# Patient Record
Sex: MALE | Race: WHITE | Employment: STUDENT | URBAN - METROPOLITAN AREA
[De-identification: names, ages, dates, MRNs, and addresses within clinical notes are randomized per-mention and may not be internally consistent; named-entity substitution may affect disease eponyms.]

---
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
## 2022-01-28 ENCOUNTER — OFFICE VISIT (OUTPATIENT)
Dept: PODIATRY CLINIC | Facility: CLINIC | Age: 22
End: 2022-01-28
Payer: COMMERCIAL

## 2022-01-28 DIAGNOSIS — S93.491S SPRAIN OF ANTERIOR TALOFIBULAR LIGAMENT OF RIGHT ANKLE, SEQUELA: ICD-10-CM

## 2022-01-28 DIAGNOSIS — M25.571 PAIN IN LATERAL PORTION OF RIGHT ANKLE: ICD-10-CM

## 2022-01-28 DIAGNOSIS — M72.2 PLANTAR FASCIITIS OF RIGHT FOOT: Primary | ICD-10-CM

## 2022-01-28 PROCEDURE — 99070 SPECIAL SUPPLIES PHYS/QHP: CPT | Performed by: STUDENT IN AN ORGANIZED HEALTH CARE EDUCATION/TRAINING PROGRAM

## 2022-01-28 PROCEDURE — 99203 OFFICE O/P NEW LOW 30 MIN: CPT | Performed by: STUDENT IN AN ORGANIZED HEALTH CARE EDUCATION/TRAINING PROGRAM

## 2022-01-28 PROCEDURE — 73610 X-RAY EXAM OF ANKLE: CPT | Performed by: STUDENT IN AN ORGANIZED HEALTH CARE EDUCATION/TRAINING PROGRAM

## 2022-01-28 RX ORDER — METHYLPREDNISOLONE 4 MG/1
TABLET ORAL
Qty: 21 TABLET | Refills: 0 | Status: SHIPPED | OUTPATIENT
Start: 2022-01-28

## 2022-01-28 NOTE — PROGRESS NOTES
[facility] Podiatry  Progress Note    Serina Dixon is a 25year old male. Patient presents with:  New Patient: Patient here with mother. Right foot Pain for 1 month has gotten worse. Injured Right ankle last year. . Swelling Right foot medial arch. Prosper Valdez denies headaches  MUSCULO: denies arthritis, back pain      EXAM:   There were no vitals taken for this visit. GENERAL: well developed, well nourished, in no apparent distress  EXTREMITIES:   1. Integument: Normal skin temperature and turgor  2.  Vascular: arch of right foot.  -Discussed importance of proper shoe gear and orthotics. Redi orthotics were dispensed to patient in clinic. He should try wearing these with ambulation and in his basketball shoes.   -Medrol Dosepak prescribed to help with inflammati

## 2022-01-29 NOTE — PATIENT INSTRUCTIONS
-Ambulate with supportive shoe gear and inserts.  -Try to perform regular stretching exercises 3-5 times per day. -Take Medrol Dosepak as prescribed.  -Complete physical therapy as prescribed. -Can consider MRI if symptoms worsen or fail to improve.

## 2022-03-14 ENCOUNTER — TELEPHONE (OUTPATIENT)
Dept: PHYSICAL THERAPY | Facility: HOSPITAL | Age: 22
End: 2022-03-14

## 2022-03-16 ENCOUNTER — OFFICE VISIT (OUTPATIENT)
Dept: PHYSICAL THERAPY | Age: 22
End: 2022-03-16
Attending: STUDENT IN AN ORGANIZED HEALTH CARE EDUCATION/TRAINING PROGRAM
Payer: COMMERCIAL

## 2022-03-16 DIAGNOSIS — M72.2 PLANTAR FASCIITIS OF RIGHT FOOT: ICD-10-CM

## 2022-03-16 DIAGNOSIS — S93.491S SPRAIN OF ANTERIOR TALOFIBULAR LIGAMENT OF RIGHT ANKLE, SEQUELA: ICD-10-CM

## 2022-03-16 DIAGNOSIS — M25.571 PAIN IN LATERAL PORTION OF RIGHT ANKLE: ICD-10-CM

## 2022-03-16 PROCEDURE — 97161 PT EVAL LOW COMPLEX 20 MIN: CPT

## 2022-03-16 PROCEDURE — 97110 THERAPEUTIC EXERCISES: CPT

## 2022-03-22 ENCOUNTER — OFFICE VISIT (OUTPATIENT)
Dept: PHYSICAL THERAPY | Age: 22
End: 2022-03-22
Attending: STUDENT IN AN ORGANIZED HEALTH CARE EDUCATION/TRAINING PROGRAM
Payer: COMMERCIAL

## 2022-03-22 PROCEDURE — 97140 MANUAL THERAPY 1/> REGIONS: CPT

## 2022-03-22 PROCEDURE — 97110 THERAPEUTIC EXERCISES: CPT

## 2022-03-24 ENCOUNTER — OFFICE VISIT (OUTPATIENT)
Dept: PHYSICAL THERAPY | Age: 22
End: 2022-03-24
Attending: STUDENT IN AN ORGANIZED HEALTH CARE EDUCATION/TRAINING PROGRAM
Payer: COMMERCIAL

## 2022-03-24 PROCEDURE — 97110 THERAPEUTIC EXERCISES: CPT

## 2022-03-24 PROCEDURE — 97140 MANUAL THERAPY 1/> REGIONS: CPT

## 2022-03-29 ENCOUNTER — OFFICE VISIT (OUTPATIENT)
Dept: PHYSICAL THERAPY | Age: 22
End: 2022-03-29
Attending: STUDENT IN AN ORGANIZED HEALTH CARE EDUCATION/TRAINING PROGRAM
Payer: COMMERCIAL

## 2022-03-29 ENCOUNTER — TELEPHONE (OUTPATIENT)
Dept: PHYSICAL THERAPY | Facility: HOSPITAL | Age: 22
End: 2022-03-29

## 2022-03-29 PROCEDURE — 97140 MANUAL THERAPY 1/> REGIONS: CPT

## 2022-03-29 PROCEDURE — 97110 THERAPEUTIC EXERCISES: CPT

## 2022-03-31 ENCOUNTER — OFFICE VISIT (OUTPATIENT)
Dept: PHYSICAL THERAPY | Age: 22
End: 2022-03-31
Attending: STUDENT IN AN ORGANIZED HEALTH CARE EDUCATION/TRAINING PROGRAM
Payer: COMMERCIAL

## 2022-03-31 PROCEDURE — 97110 THERAPEUTIC EXERCISES: CPT

## 2022-04-04 ENCOUNTER — OFFICE VISIT (OUTPATIENT)
Dept: PHYSICAL THERAPY | Age: 22
End: 2022-04-04
Attending: STUDENT IN AN ORGANIZED HEALTH CARE EDUCATION/TRAINING PROGRAM
Payer: COMMERCIAL

## 2022-04-04 PROCEDURE — 97110 THERAPEUTIC EXERCISES: CPT

## 2022-04-06 ENCOUNTER — OFFICE VISIT (OUTPATIENT)
Dept: PHYSICAL THERAPY | Age: 22
End: 2022-04-06
Attending: STUDENT IN AN ORGANIZED HEALTH CARE EDUCATION/TRAINING PROGRAM
Payer: COMMERCIAL

## 2022-04-06 PROCEDURE — 97110 THERAPEUTIC EXERCISES: CPT

## 2022-04-12 ENCOUNTER — OFFICE VISIT (OUTPATIENT)
Dept: PHYSICAL THERAPY | Age: 22
End: 2022-04-12
Attending: STUDENT IN AN ORGANIZED HEALTH CARE EDUCATION/TRAINING PROGRAM
Payer: COMMERCIAL

## 2022-04-12 PROCEDURE — 97110 THERAPEUTIC EXERCISES: CPT

## 2022-04-12 PROCEDURE — 97140 MANUAL THERAPY 1/> REGIONS: CPT

## 2022-04-27 ENCOUNTER — APPOINTMENT (OUTPATIENT)
Dept: PHYSICAL THERAPY | Age: 22
End: 2022-04-27
Attending: STUDENT IN AN ORGANIZED HEALTH CARE EDUCATION/TRAINING PROGRAM
Payer: COMMERCIAL

## 2022-05-11 ENCOUNTER — OFFICE VISIT (OUTPATIENT)
Dept: PHYSICAL THERAPY | Age: 22
End: 2022-05-11
Attending: STUDENT IN AN ORGANIZED HEALTH CARE EDUCATION/TRAINING PROGRAM
Payer: COMMERCIAL

## 2022-05-11 PROCEDURE — 97110 THERAPEUTIC EXERCISES: CPT

## 2022-05-12 ENCOUNTER — TELEPHONE (OUTPATIENT)
Dept: PODIATRY CLINIC | Facility: CLINIC | Age: 22
End: 2022-05-12

## 2022-05-12 NOTE — TELEPHONE ENCOUNTER
Pt sprained his ankle on Sunday asking if he can be seen today - Dr Rafaela Unger suggested to mom to see Dr Aline Ramsey today

## 2022-05-12 NOTE — TELEPHONE ENCOUNTER
Spoke with mother. States son was playing basketball and tripped over another player's foot, twisting his ankle. He has mild to moderate pain. I offered mother appointment today or tomorrow with another provider, she declines and wishes to see Dr. Kaylah Isabel next week. Patient is wearing cam boot, icin and elevating at rest.  OTC NSAIDs. Scheduled 5/18 and advised observation. To call back for sooner appointment or seek treatment in IC if symptoms progress. She verbalized understanding and compliance.

## 2022-05-18 ENCOUNTER — OFFICE VISIT (OUTPATIENT)
Dept: PODIATRY CLINIC | Facility: CLINIC | Age: 22
End: 2022-05-18
Payer: COMMERCIAL

## 2022-05-18 DIAGNOSIS — S86.301S PERONEAL TENDON INJURY, RIGHT, SEQUELA: ICD-10-CM

## 2022-05-18 DIAGNOSIS — M25.571 ACUTE RIGHT ANKLE PAIN: ICD-10-CM

## 2022-05-18 DIAGNOSIS — S93.491S SPRAIN OF ANTERIOR TALOFIBULAR LIGAMENT OF RIGHT ANKLE, SEQUELA: Primary | ICD-10-CM

## 2022-05-18 PROCEDURE — 73610 X-RAY EXAM OF ANKLE: CPT | Performed by: STUDENT IN AN ORGANIZED HEALTH CARE EDUCATION/TRAINING PROGRAM

## 2022-05-18 PROCEDURE — 99213 OFFICE O/P EST LOW 20 MIN: CPT | Performed by: STUDENT IN AN ORGANIZED HEALTH CARE EDUCATION/TRAINING PROGRAM

## 2022-05-21 NOTE — PATIENT INSTRUCTIONS
-Complete MRI of ankle as ordered. -Remain in cam boot until MRI is complete. Pending MRI results, may possibly transition out of the boot and resume physical therapy.  -Ice/elevate/rest right lower extremity at this time.  -We will call with MRI results.

## 2022-06-02 ENCOUNTER — APPOINTMENT (OUTPATIENT)
Dept: PHYSICAL THERAPY | Age: 22
End: 2022-06-02
Attending: STUDENT IN AN ORGANIZED HEALTH CARE EDUCATION/TRAINING PROGRAM
Payer: COMMERCIAL

## 2022-06-07 ENCOUNTER — TELEPHONE (OUTPATIENT)
Dept: PHYSICAL THERAPY | Age: 22
End: 2022-06-07

## 2022-06-07 ENCOUNTER — APPOINTMENT (OUTPATIENT)
Dept: PHYSICAL THERAPY | Age: 22
End: 2022-06-07
Attending: STUDENT IN AN ORGANIZED HEALTH CARE EDUCATION/TRAINING PROGRAM
Payer: COMMERCIAL

## 2022-06-10 ENCOUNTER — OFFICE VISIT (OUTPATIENT)
Dept: PHYSICAL THERAPY | Age: 22
End: 2022-06-10
Attending: STUDENT IN AN ORGANIZED HEALTH CARE EDUCATION/TRAINING PROGRAM
Payer: COMMERCIAL

## 2022-06-10 PROCEDURE — 97110 THERAPEUTIC EXERCISES: CPT

## 2022-06-10 NOTE — PATIENT INSTRUCTIONS
Exercises:  -Flat BOSU - squat or chest pass against trampoline  -Round BOSU - single leg stance  -Single leg quick heel raise at wall  -Sideways step down 6 or 8 inch step down  -Leg press machine  -Dead lifts

## 2022-06-21 ENCOUNTER — OFFICE VISIT (OUTPATIENT)
Dept: PHYSICAL THERAPY | Age: 22
End: 2022-06-21
Attending: STUDENT IN AN ORGANIZED HEALTH CARE EDUCATION/TRAINING PROGRAM
Payer: COMMERCIAL

## 2022-06-21 PROCEDURE — 97110 THERAPEUTIC EXERCISES: CPT

## 2022-06-21 NOTE — PATIENT INSTRUCTIONS
Exercises:  -Single leg stance on round BOSU, ball toss at [employer]  -While squatting, think of maintaining \"tri-pod\" foot posture  -Ankle taps, keeping toes relaxed  -Shoulder external rotation with cable (cable mid torso height, pull outwards)
chest pain

## 2022-06-23 ENCOUNTER — OFFICE VISIT (OUTPATIENT)
Dept: PHYSICAL THERAPY | Age: 22
End: 2022-06-23
Attending: STUDENT IN AN ORGANIZED HEALTH CARE EDUCATION/TRAINING PROGRAM
Payer: COMMERCIAL

## 2022-06-23 PROCEDURE — 97110 THERAPEUTIC EXERCISES: CPT

## 2022-06-27 ENCOUNTER — APPOINTMENT (OUTPATIENT)
Dept: PHYSICAL THERAPY | Age: 22
End: 2022-06-27
Attending: STUDENT IN AN ORGANIZED HEALTH CARE EDUCATION/TRAINING PROGRAM
Payer: COMMERCIAL

## 2022-06-29 ENCOUNTER — APPOINTMENT (OUTPATIENT)
Dept: PHYSICAL THERAPY | Age: 22
End: 2022-06-29
Attending: STUDENT IN AN ORGANIZED HEALTH CARE EDUCATION/TRAINING PROGRAM
Payer: COMMERCIAL

## 2022-07-06 ENCOUNTER — OFFICE VISIT (OUTPATIENT)
Dept: PHYSICAL THERAPY | Age: 22
End: 2022-07-06
Attending: STUDENT IN AN ORGANIZED HEALTH CARE EDUCATION/TRAINING PROGRAM
Payer: COMMERCIAL

## 2022-07-06 PROCEDURE — 97110 THERAPEUTIC EXERCISES: CPT

## 2022-07-21 ENCOUNTER — OFFICE VISIT (OUTPATIENT)
Dept: PHYSICAL THERAPY | Age: 22
End: 2022-07-21
Attending: STUDENT IN AN ORGANIZED HEALTH CARE EDUCATION/TRAINING PROGRAM
Payer: COMMERCIAL

## 2022-07-21 PROCEDURE — 97140 MANUAL THERAPY 1/> REGIONS: CPT

## 2022-07-21 PROCEDURE — 97110 THERAPEUTIC EXERCISES: CPT

## 2022-08-18 ENCOUNTER — TELEPHONE (OUTPATIENT)
Dept: PHYSICAL THERAPY | Age: 22
End: 2022-08-18

## 2022-08-18 ENCOUNTER — APPOINTMENT (OUTPATIENT)
Dept: PHYSICAL THERAPY | Age: 22
End: 2022-08-18
Attending: ORTHOPAEDIC SURGERY
Payer: COMMERCIAL

## 2022-08-18 NOTE — TELEPHONE ENCOUNTER
Pt didn't arrive for appointment 8/18/22 at 10:45am. Left voicemail to discuss progress to determine if he wanted to discharge from this episode of care or reschedule the visit to follow-up in person.

## 2022-08-24 ENCOUNTER — LAB ENCOUNTER (OUTPATIENT)
Dept: LAB | Facility: HOSPITAL | Age: 22
End: 2022-08-24
Attending: PEDIATRICS
Payer: COMMERCIAL

## 2022-08-24 DIAGNOSIS — R05.9 COUGH, UNSPECIFIED TYPE: Primary | ICD-10-CM

## 2022-08-24 DIAGNOSIS — R05.9 COUGH, UNSPECIFIED TYPE: ICD-10-CM

## 2022-08-25 LAB — SARS-COV-2 RNA RESP QL NAA+PROBE: DETECTED

## 2022-10-08 ENCOUNTER — APPOINTMENT (OUTPATIENT)
Dept: GENERAL RADIOLOGY | Facility: HOSPITAL | Age: 22
End: 2022-10-08
Attending: EMERGENCY MEDICINE
Payer: COMMERCIAL

## 2022-10-08 ENCOUNTER — HOSPITAL ENCOUNTER (EMERGENCY)
Facility: HOSPITAL | Age: 22
Discharge: HOME | End: 2022-10-09
Attending: EMERGENCY MEDICINE
Payer: COMMERCIAL

## 2022-10-08 DIAGNOSIS — R07.89 CHEST PAIN, ATYPICAL: Primary | ICD-10-CM

## 2022-10-08 LAB
ALBUMIN SERPL-MCNC: 4 G/DL (ref 3.4–5)
ALBUMIN/GLOB SERPL: 1.4 {RATIO} (ref 1–2)
ALP LIVER SERPL-CCNC: 64 U/L
ALT SERPL-CCNC: 28 U/L
ANION GAP SERPL CALC-SCNC: 3 MMOL/L (ref 0–18)
AST SERPL-CCNC: 30 U/L (ref 15–37)
BASOPHILS # BLD AUTO: 0.05 X10(3) UL (ref 0–0.2)
BASOPHILS NFR BLD AUTO: 0.7 %
BILIRUB SERPL-MCNC: 0.5 MG/DL (ref 0.1–2)
BUN BLD-MCNC: 19 MG/DL (ref 7–18)
CALCIUM BLD-MCNC: 9.3 MG/DL (ref 8.5–10.1)
CHLORIDE SERPL-SCNC: 106 MMOL/L (ref 98–112)
CO2 SERPL-SCNC: 30 MMOL/L (ref 21–32)
CREAT BLD-MCNC: 1.5 MG/DL
D DIMER PPP FEU-MCNC: <0.27 UG/ML FEU (ref ?–0.5)
EOSINOPHIL # BLD AUTO: 0.16 X10(3) UL (ref 0–0.7)
EOSINOPHIL NFR BLD AUTO: 2.2 %
ERYTHROCYTE [DISTWIDTH] IN BLOOD BY AUTOMATED COUNT: 13 %
GFR SERPLBLD BASED ON 1.73 SQ M-ARVRAT: 68 ML/MIN/1.73M2 (ref 60–?)
GLOBULIN PLAS-MCNC: 2.9 G/DL (ref 2.8–4.4)
GLUCOSE BLD-MCNC: 92 MG/DL (ref 70–99)
HCT VFR BLD AUTO: 40.5 %
HGB BLD-MCNC: 13.6 G/DL
IMM GRANULOCYTES # BLD AUTO: 0.01 X10(3) UL (ref 0–1)
IMM GRANULOCYTES NFR BLD: 0.1 %
LYMPHOCYTES # BLD AUTO: 2.16 X10(3) UL (ref 1–4)
LYMPHOCYTES NFR BLD AUTO: 30.3 %
MCH RBC QN AUTO: 28.8 PG (ref 26–34)
MCHC RBC AUTO-ENTMCNC: 33.6 G/DL (ref 31–37)
MCV RBC AUTO: 85.6 FL
MONOCYTES # BLD AUTO: 0.72 X10(3) UL (ref 0.1–1)
MONOCYTES NFR BLD AUTO: 10.1 %
NEUTROPHILS # BLD AUTO: 4.03 X10 (3) UL (ref 1.5–7.7)
NEUTROPHILS # BLD AUTO: 4.03 X10(3) UL (ref 1.5–7.7)
NEUTROPHILS NFR BLD AUTO: 56.6 %
OSMOLALITY SERPL CALC.SUM OF ELEC: 290 MOSM/KG (ref 275–295)
PLATELET # BLD AUTO: 197 10(3)UL (ref 150–450)
POTASSIUM SERPL-SCNC: 3.8 MMOL/L (ref 3.5–5.1)
PROT SERPL-MCNC: 6.9 G/DL (ref 6.4–8.2)
RBC # BLD AUTO: 4.73 X10(6)UL
SODIUM SERPL-SCNC: 139 MMOL/L (ref 136–145)
TROPONIN I HIGH SENSITIVITY: 6 NG/L
WBC # BLD AUTO: 7.1 X10(3) UL (ref 4–11)

## 2022-10-08 PROCEDURE — 85379 FIBRIN DEGRADATION QUANT: CPT | Performed by: EMERGENCY MEDICINE

## 2022-10-08 PROCEDURE — 99285 EMERGENCY DEPT VISIT HI MDM: CPT

## 2022-10-08 PROCEDURE — 71046 X-RAY EXAM CHEST 2 VIEWS: CPT | Performed by: EMERGENCY MEDICINE

## 2022-10-08 PROCEDURE — 96374 THER/PROPH/DIAG INJ IV PUSH: CPT

## 2022-10-08 PROCEDURE — 85025 COMPLETE CBC W/AUTO DIFF WBC: CPT

## 2022-10-08 PROCEDURE — 84484 ASSAY OF TROPONIN QUANT: CPT

## 2022-10-08 PROCEDURE — 80053 COMPREHEN METABOLIC PANEL: CPT | Performed by: EMERGENCY MEDICINE

## 2022-10-08 PROCEDURE — 85025 COMPLETE CBC W/AUTO DIFF WBC: CPT | Performed by: EMERGENCY MEDICINE

## 2022-10-08 PROCEDURE — 93010 ELECTROCARDIOGRAM REPORT: CPT

## 2022-10-08 PROCEDURE — 80053 COMPREHEN METABOLIC PANEL: CPT

## 2022-10-08 PROCEDURE — 93005 ELECTROCARDIOGRAM TRACING: CPT

## 2022-10-08 PROCEDURE — 84484 ASSAY OF TROPONIN QUANT: CPT | Performed by: EMERGENCY MEDICINE

## 2022-10-08 RX ORDER — KETOROLAC TROMETHAMINE 15 MG/ML
15 INJECTION, SOLUTION INTRAMUSCULAR; INTRAVENOUS ONCE
Status: COMPLETED | OUTPATIENT
Start: 2022-10-08 | End: 2022-10-08

## 2022-10-09 VITALS
DIASTOLIC BLOOD PRESSURE: 62 MMHG | TEMPERATURE: 98 F | RESPIRATION RATE: 16 BRPM | SYSTOLIC BLOOD PRESSURE: 124 MMHG | OXYGEN SATURATION: 98 % | HEART RATE: 45 BPM

## 2022-10-09 LAB
ATRIAL RATE: 42 BPM
ATRIAL RATE: 44 BPM
P AXIS: 54 DEGREES
P AXIS: 55 DEGREES
P-R INTERVAL: 166 MS
P-R INTERVAL: 172 MS
Q-T INTERVAL: 446 MS
Q-T INTERVAL: 480 MS
QRS DURATION: 102 MS
QRS DURATION: 106 MS
QTC CALCULATION (BEZET): 381 MS
QTC CALCULATION (BEZET): 400 MS
R AXIS: 82 DEGREES
R AXIS: 85 DEGREES
T AXIS: 54 DEGREES
T AXIS: 60 DEGREES
VENTRICULAR RATE: 42 BPM
VENTRICULAR RATE: 44 BPM

## 2022-10-09 PROCEDURE — 93005 ELECTROCARDIOGRAM TRACING: CPT

## 2022-10-09 NOTE — ED INITIAL ASSESSMENT (HPI)
Pt to ED from home with complaints of CP that woke him out of his sleep at 5am this morning. Pt states the chest pain comes and goes, describes it as achy, rates it a 6/10. Pt has no other medical hx.

## 2022-10-10 ENCOUNTER — HOSPITAL ENCOUNTER (OUTPATIENT)
Dept: CV DIAGNOSTICS | Facility: HOSPITAL | Age: 22
Discharge: HOME | End: 2022-10-10
Attending: EMERGENCY MEDICINE
Payer: COMMERCIAL

## 2022-10-10 DIAGNOSIS — R07.89 CHEST PAIN, ATYPICAL: ICD-10-CM

## 2022-10-10 PROCEDURE — 93306 TTE W/DOPPLER COMPLETE: CPT | Performed by: EMERGENCY MEDICINE

## 2023-01-19 PROBLEM — F32.9 MDD (MAJOR DEPRESSIVE DISORDER): Status: ACTIVE | Noted: 2023-01-19

## 2023-01-20 PROBLEM — F41.9 ANXIETY DISORDER, UNSPECIFIED: Status: ACTIVE | Noted: 2023-01-20

## 2023-01-20 PROBLEM — F31.2 BIPOLAR DISORDER, CURRENT EPISODE MANIC SEVERE WITH PSYCHOTIC FEATURES (HCC): Status: ACTIVE | Noted: 2023-01-20

## 2023-02-07 PROBLEM — F31.9 BIPOLAR 1 DISORDER (HCC): Status: ACTIVE | Noted: 2023-02-07

## 2023-07-12 ENCOUNTER — LAB ENCOUNTER (OUTPATIENT)
Dept: LAB | Age: 23
End: 2023-07-12
Payer: COMMERCIAL

## 2023-07-12 PROCEDURE — 36415 COLL VENOUS BLD VENIPUNCTURE: CPT

## 2023-07-12 PROCEDURE — 84443 ASSAY THYROID STIM HORMONE: CPT

## 2023-07-12 PROCEDURE — 84439 ASSAY OF FREE THYROXINE: CPT

## 2024-05-22 ENCOUNTER — OFFICE VISIT (OUTPATIENT)
Dept: PODIATRY CLINIC | Facility: CLINIC | Age: 24
End: 2024-05-22

## 2024-05-22 DIAGNOSIS — M77.52 CAPSULITIS OF TOE, LEFT: Primary | ICD-10-CM

## 2024-05-22 DIAGNOSIS — M20.12 HALLUX VALGUS, LEFT: ICD-10-CM

## 2024-05-22 PROCEDURE — 99213 OFFICE O/P EST LOW 20 MIN: CPT | Performed by: STUDENT IN AN ORGANIZED HEALTH CARE EDUCATION/TRAINING PROGRAM

## 2024-05-22 RX ORDER — METHYLPREDNISOLONE 4 MG/1
TABLET ORAL
Qty: 21 TABLET | Refills: 0 | Status: SHIPPED | OUTPATIENT
Start: 2024-05-22
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
## 2025-01-10 ENCOUNTER — OFFICE VISIT (OUTPATIENT)
Dept: SURGERY | Facility: CLINIC | Age: 25
End: 2025-01-10
Payer: COMMERCIAL

## 2025-01-10 VITALS
SYSTOLIC BLOOD PRESSURE: 113 MMHG | HEIGHT: 74 IN | HEART RATE: 64 BPM | WEIGHT: 203 LBS | BODY MASS INDEX: 26.05 KG/M2 | TEMPERATURE: 98 F | DIASTOLIC BLOOD PRESSURE: 68 MMHG | OXYGEN SATURATION: 98 %

## 2025-01-10 DIAGNOSIS — N62 GYNECOMASTIA: Primary | ICD-10-CM

## 2025-01-10 PROCEDURE — 99203 OFFICE O/P NEW LOW 30 MIN: CPT | Performed by: SURGERY
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
## 2025-02-17 ENCOUNTER — DOCUMENTATION ONLY (OUTPATIENT)
Dept: SURGERY | Facility: CLINIC | Age: 25
End: 2025-02-17

## 2025-02-17 ENCOUNTER — TELEPHONE (OUTPATIENT)
Dept: SURGERY | Facility: CLINIC | Age: 25
End: 2025-02-17

## 2025-02-17 PROBLEM — N62 GYNECOMASTIA: Status: ACTIVE | Noted: 2025-02-17

## 2025-02-17 NOTE — PATIENT INSTRUCTIONS
This is not a pre-op    Surgeon:         Dr. Sherman Fontenot                                        Tel:         [phone]                                  Fax:        [phone]    Surgery/Procedure: excision and liposuction of bilateral gynecomastia, 2 hours, general anesthesia, outpatient    Dx Code: N62    Hospital:  [facility]: [address] [place] [address]           [phone]  [facility]: [address] [place] [address]               [phone]    1. Someone will need to drive you to and from the hospital if your procedure is outpatient.    2.Do not drink alcohol or smoke 24 hours prior to your procedure.    3. Bring a picture ID and your insurance card.    4. You will be contacted by the hospital the day before to confirm the procedure time and location.     5. Do not take any herbal supplements or blood thinners at least one week before your procedure/surgery. This includes NSAID's (aspirin, baby aspirin, Motrin, Ibuprofen, Aleve, Advil, Naproxen, etc), Fish oil, vitamin E, turmeric, CoQ10, or green tea supplements, etc. *TYLENOL or acetaminophen is ok to take*    6. PRE-OPERATIVE TESTING: History and physical with medical clearance is REQUIRED within 30 days of the surgery date and is mandatory per Dr. Fontenot. *If this is not done, your surgery will be postponed*  MEDICAL CLEARANCE WITH  ____  CBC  CMP  EKG (within 90 days)    7. If you take Coumadin, Plavix, Xarelto, or Eliquis, please contact your prescribing physician for special instructions on how long to hold. If you take insulin, contact your primary care physician for special instructions.     8. Please inform us if you start or change any medications at least one week before surgery (ex: blood thinners, weight loss medications, diabetic medications, herbal supplements, etc)    9. Does patient have diagnosis of sleep apnea?    [   ] Yes     [   ]  No    Consent obtained  Photos taken on ______

## 2025-02-25 ENCOUNTER — TELEPHONE (OUTPATIENT)
Dept: SURGERY | Facility: CLINIC | Age: 25
End: 2025-02-25

## 2025-02-28 ENCOUNTER — TELEPHONE (OUTPATIENT)
Dept: SURGERY | Facility: CLINIC | Age: 25
End: 2025-02-28

## 2025-02-28 DIAGNOSIS — N62 GYNECOMASTIA: Primary | ICD-10-CM

## 2025-02-28 NOTE — TELEPHONE ENCOUNTER
Calling pt in regards to scheduling surgery.  Informed pt that I have 07/14/2025 available at [facility] with Dr. Fontenot.  Pt verbalized understanding and in agreement with date and location.  All questions answered.   Encouraged pt to call or [employer] message office with any other questions or concerns.

## 2025-05-20 ENCOUNTER — OFFICE VISIT (OUTPATIENT)
Facility: CLINIC | Age: 25
End: 2025-05-20
Payer: COMMERCIAL

## 2025-05-20 DIAGNOSIS — N62 GYNECOMASTIA: Primary | ICD-10-CM

## 2025-05-20 PROCEDURE — 99212 OFFICE O/P EST SF 10 MIN: CPT | Performed by: SURGERY

## 2025-05-20 NOTE — PROGRESS NOTES
Freddie Bauer is a 24 year old male who presents today for a preoperative visit in anticipation of bilateral gynecomastia surgery.  He is without new complaints.    Physical Examination:  Breasts: General Inspection of the breast shows mild breast gland enlargement left greater than right.  Mild skin excess is noted bilaterally.  There is no nipple inversion or nipple discharge noted.  There are no dominant masses noted.     Lymph Nodes:  There is no cervical, supraclavicular, or axillary lymphadenopathy appreciated.     Discussion and Plan:  We discussed bilateral breast liposuction and direct gland excision via an infra areolar approach.  The nature of the procedure was reviewed with the patient.  We discussed the risks of surgery including but not limited to bleeding, infection, scarring, delayed wound healing, loss of nipple sensation, nipple areolar necrosis, contour abnormalities, asymmetry, injury to adjacent structures, seroma, and need for further surgery.  We discussed expected postoperative course including need for drains, activity limitation, and compression.  Multiple questions were answered to the patient satisfaction.  No guarantees as to outcome were offered.  The patient expresses understanding and wishes to proceed.

## 2025-05-20 NOTE — PATIENT INSTRUCTIONS
Surgeon:         Dr. Sherman Fontenot                                        Tel:         [phone]                                  Fax:        [phone]    Surgery/Procedure: Excision and liposuction of bilateral gynecomastia, 2 hours, general anesthesia, outpatient     Dx Code: N62    Hospital:  [facility]: [address] [place] [id number]           [phone]  7/14/2025    1. Someone will need to drive you to and from the hospital if your procedure is outpatient.    2.Do not drink alcohol or smoke 24 hours prior to your procedure.    3. Bring a picture ID and your insurance card.    4. You will be contacted by the hospital the day before to confirm the procedure time and location.     5. Do not take any herbal supplements or blood thinners at least one week before your procedure/surgery. This includes NSAID's (aspirin, baby aspirin, Motrin, Ibuprofen, Aleve, Advil, Naproxen, etc), Fish oil, vitamin E, turmeric, CoQ10, or green tea supplements, etc. *TYLENOL or acetaminophen is ok to take*    6. PRE-OPERATIVE TESTING: History and physical with medical clearance is REQUIRED within 30 days of the surgery date and is mandatory per Dr. Fontenot. *If this is not done, your surgery will be postponed. To avoid delays with your clearance, please ensure your PCP appointment is at least 14 days prior to your surgical date.*  MEDICAL CLEARANCE WITH DR. Rdz  CBC  CMP  EKG (within 90 days)    7. If you take Coumadin, Plavix, Xarelto, or Eliquis, please contact your prescribing physician for special instructions on how long to hold. If you take insulin, contact your primary care physician for special instructions.     8. Please inform us if you start or change any medications at least one week before surgery (ex: blood thinners, weight loss medications, diabetic medications, herbal supplements, etc)    9. Does patient have diagnosis of sleep apnea?    [   ] Yes     [  x ]  No    Consent obtained  Photos  taken on 5/20/2025

## 2025-05-21 ENCOUNTER — TELEPHONE (OUTPATIENT)
Dept: SURGERY | Facility: CLINIC | Age: 25
End: 2025-05-21

## 2025-05-21 NOTE — TELEPHONE ENCOUNTER
Patient's mother contacted the office inquiring if the patient needs a pre-operative appointment with Dr. Fontenot. Explained that the patient's appointment yesterday was his pre-operative visit. Reinforced instructions for surgery. Inquired if she or the patient had additional questions for Dr. Fontenot. She stated that do not, that they understand the procedure well. Confirmed surgery date of 7/14/2025 at [facility]. Reinforced that patient will need medical clearance for upcoming surgery within 30 days with his PCP. Rebecca verbalized understanding. No additional questions at this time.

## 2025-06-05 ENCOUNTER — TELEPHONE (OUTPATIENT)
Dept: SURGERY | Facility: CLINIC | Age: 25
End: 2025-06-05

## 2025-06-05 NOTE — TELEPHONE ENCOUNTER
Called pt and talked to him, He will call us back with time and day for pcp appt with Dr. Rdz for

## 2025-06-10 ENCOUNTER — TELEPHONE (OUTPATIENT)
Dept: SURGERY | Facility: CLINIC | Age: 25
End: 2025-06-10

## 2025-06-10 NOTE — TELEPHONE ENCOUNTER
Called pt and talked to him to follow up if he made his pcp for his up coming surgery. Pt stated no, but will call today to make appt and will call back with time and date.

## 2025-06-23 ENCOUNTER — LAB ENCOUNTER (OUTPATIENT)
Dept: LAB | Facility: HOSPITAL | Age: 25
End: 2025-06-23
Attending: PEDIATRICS
Payer: COMMERCIAL

## 2025-06-23 DIAGNOSIS — Z01.818 PREOP EXAMINATION: Primary | ICD-10-CM

## 2025-06-23 DIAGNOSIS — Z01.818 PREOP EXAMINATION: ICD-10-CM

## 2025-06-23 LAB
ALBUMIN SERPL-MCNC: 4.7 G/DL (ref 3.2–4.8)
ALBUMIN/GLOB SERPL: 2.4 {RATIO} (ref 1–2)
ALP LIVER SERPL-CCNC: 45 U/L (ref 45–117)
ALT SERPL-CCNC: 12 U/L (ref 10–49)
ANION GAP SERPL CALC-SCNC: 6 MMOL/L (ref 0–18)
AST SERPL-CCNC: 17 U/L (ref ?–34)
BASOPHILS # BLD AUTO: 0.05 X10(3) UL (ref 0–0.2)
BASOPHILS NFR BLD AUTO: 1 %
BILIRUB SERPL-MCNC: 0.9 MG/DL (ref 0.3–1.2)
BUN BLD-MCNC: 13 MG/DL (ref 9–23)
CALCIUM BLD-MCNC: 9.6 MG/DL (ref 8.7–10.6)
CHLORIDE SERPL-SCNC: 105 MMOL/L (ref 98–112)
CO2 SERPL-SCNC: 31 MMOL/L (ref 21–32)
CREAT BLD-MCNC: 1.19 MG/DL (ref 0.7–1.3)
EGFRCR SERPLBLD CKD-EPI 2021: 89 ML/MIN/1.73M2 (ref 60–?)
EOSINOPHIL # BLD AUTO: 0.15 X10(3) UL (ref 0–0.7)
EOSINOPHIL NFR BLD AUTO: 2.9 %
ERYTHROCYTE [DISTWIDTH] IN BLOOD BY AUTOMATED COUNT: 12.9 %
FASTING STATUS PATIENT QL REPORTED: YES
GLOBULIN PLAS-MCNC: 2 G/DL (ref 2–3.5)
GLUCOSE BLD-MCNC: 96 MG/DL (ref 70–99)
HCT VFR BLD AUTO: 41.1 % (ref 39–53)
HGB BLD-MCNC: 13.8 G/DL (ref 13–17.5)
IMM GRANULOCYTES # BLD AUTO: 0.02 X10(3) UL (ref 0–1)
IMM GRANULOCYTES NFR BLD: 0.4 %
LITHIUM SERPL-SCNC: 0.6 MMOL/L (ref 0.6–1.2)
LYMPHOCYTES # BLD AUTO: 1.7 X10(3) UL (ref 1–4)
LYMPHOCYTES NFR BLD AUTO: 32.8 %
MCH RBC QN AUTO: 28.3 PG (ref 26–34)
MCHC RBC AUTO-ENTMCNC: 33.6 G/DL (ref 31–37)
MCV RBC AUTO: 84.2 FL (ref 80–100)
MONOCYTES # BLD AUTO: 0.49 X10(3) UL (ref 0.1–1)
MONOCYTES NFR BLD AUTO: 9.4 %
NEUTROPHILS # BLD AUTO: 2.78 X10 (3) UL (ref 1.5–7.7)
NEUTROPHILS # BLD AUTO: 2.78 X10(3) UL (ref 1.5–7.7)
NEUTROPHILS NFR BLD AUTO: 53.5 %
OSMOLALITY SERPL CALC.SUM OF ELEC: 294 MOSM/KG (ref 275–295)
PLATELET # BLD AUTO: 233 10(3)UL (ref 150–450)
POTASSIUM SERPL-SCNC: 4.4 MMOL/L (ref 3.5–5.1)
PROT SERPL-MCNC: 6.7 G/DL (ref 5.7–8.2)
RBC # BLD AUTO: 4.88 X10(6)UL (ref 4.3–5.7)
SODIUM SERPL-SCNC: 142 MMOL/L (ref 136–145)
T4 FREE SERPL-MCNC: 1.2 NG/DL (ref 0.8–1.7)
TSI SER-ACNC: 2.46 UIU/ML (ref 0.55–4.78)
WBC # BLD AUTO: 5.2 X10(3) UL (ref 4–11)

## 2025-06-23 PROCEDURE — 85025 COMPLETE CBC W/AUTO DIFF WBC: CPT

## 2025-06-23 PROCEDURE — 84439 ASSAY OF FREE THYROXINE: CPT

## 2025-06-23 PROCEDURE — 80053 COMPREHEN METABOLIC PANEL: CPT

## 2025-06-23 PROCEDURE — 80178 ASSAY OF LITHIUM: CPT

## 2025-06-23 PROCEDURE — 84443 ASSAY THYROID STIM HORMONE: CPT

## 2025-06-23 PROCEDURE — 36415 COLL VENOUS BLD VENIPUNCTURE: CPT

## 2025-06-24 ENCOUNTER — TELEPHONE (OUTPATIENT)
Dept: SURGERY | Facility: CLINIC | Age: 25
End: 2025-06-24

## 2025-06-27 ENCOUNTER — ORDER TRANSCRIPTION (OUTPATIENT)
Dept: PHYSICAL THERAPY | Facility: HOSPITAL | Age: 25
End: 2025-06-27

## 2025-06-27 DIAGNOSIS — M23.671: Primary | ICD-10-CM

## 2025-06-30 ENCOUNTER — TELEPHONE (OUTPATIENT)
Dept: SURGERY | Facility: CLINIC | Age: 25
End: 2025-06-30

## 2025-06-30 DIAGNOSIS — N62 GYNECOMASTIA: Primary | ICD-10-CM

## 2025-06-30 RX ORDER — MULTIVIT-MIN/IRON/FOLIC ACID/K 18-600-40
CAPSULE ORAL
COMMUNITY

## 2025-06-30 RX ORDER — GARLIC EXTRACT 500 MG
1 CAPSULE ORAL DAILY
COMMUNITY

## 2025-06-30 NOTE — TELEPHONE ENCOUNTER
Calling pt in regards to scheduling surgery.  Informed pt that I have 7/11/25 available at [facility] with Dr. Fontenot.  Pt verbalized understanding and in agreement with date and location.  All questions answered.   Encouraged pt to call or [employer] message office with any other questions or concerns.

## 2025-06-30 NOTE — TELEPHONE ENCOUNTER
Multiple attempts made to reach the patient to reschedule his surgery to 7/15/25.  Contacted patient's mother, Rebecca. Rebecca confirmed 7/15/2025 at [facility] with Dr. Fontenot.

## 2025-07-10 ENCOUNTER — ANESTHESIA EVENT (OUTPATIENT)
Dept: SURGERY | Facility: HOSPITAL | Age: 25
End: 2025-07-10
Payer: COMMERCIAL

## 2025-07-11 ENCOUNTER — HOSPITAL ENCOUNTER (OUTPATIENT)
Facility: HOSPITAL | Age: 25
Setting detail: HOSPITAL OUTPATIENT SURGERY
Discharge: HOME | End: 2025-07-11
Attending: SURGERY | Admitting: SURGERY
Payer: COMMERCIAL

## 2025-07-11 ENCOUNTER — ANESTHESIA (OUTPATIENT)
Dept: SURGERY | Facility: HOSPITAL | Age: 25
End: 2025-07-11
Payer: COMMERCIAL

## 2025-07-11 VITALS
OXYGEN SATURATION: 100 % | RESPIRATION RATE: 21 BRPM | TEMPERATURE: 98 F | HEIGHT: 74 IN | BODY MASS INDEX: 27.36 KG/M2 | DIASTOLIC BLOOD PRESSURE: 70 MMHG | SYSTOLIC BLOOD PRESSURE: 124 MMHG | HEART RATE: 103 BPM | WEIGHT: 213.19 LBS

## 2025-07-11 DIAGNOSIS — N62 GYNECOMASTIA: ICD-10-CM

## 2025-07-11 PROCEDURE — 88305 TISSUE EXAM BY PATHOLOGIST: CPT | Performed by: SURGERY

## 2025-07-11 RX ORDER — MIDAZOLAM HYDROCHLORIDE 1 MG/ML
1 INJECTION INTRAMUSCULAR; INTRAVENOUS EVERY 5 MIN PRN
Status: DISCONTINUED | OUTPATIENT
Start: 2025-07-11 | End: 2025-07-11

## 2025-07-11 RX ORDER — PROCHLORPERAZINE EDISYLATE 5 MG/ML
5 INJECTION INTRAMUSCULAR; INTRAVENOUS EVERY 8 HOURS PRN
Status: DISCONTINUED | OUTPATIENT
Start: 2025-07-11 | End: 2025-07-11

## 2025-07-11 RX ORDER — PROCHLORPERAZINE EDISYLATE 5 MG/ML
INJECTION INTRAMUSCULAR; INTRAVENOUS
Status: COMPLETED
Start: 2025-07-11 | End: 2025-07-11

## 2025-07-11 RX ORDER — HYDROMORPHONE HYDROCHLORIDE 1 MG/ML
0.6 INJECTION, SOLUTION INTRAMUSCULAR; INTRAVENOUS; SUBCUTANEOUS EVERY 5 MIN PRN
Status: DISCONTINUED | OUTPATIENT
Start: 2025-07-11 | End: 2025-07-11

## 2025-07-11 RX ORDER — NALOXONE HYDROCHLORIDE 0.4 MG/ML
80 INJECTION, SOLUTION INTRAMUSCULAR; INTRAVENOUS; SUBCUTANEOUS AS NEEDED
Status: DISCONTINUED | OUTPATIENT
Start: 2025-07-11 | End: 2025-07-11

## 2025-07-11 RX ORDER — SCOPOLAMINE 1 MG/3D
1 PATCH, EXTENDED RELEASE TRANSDERMAL ONCE
Status: DISCONTINUED | OUTPATIENT
Start: 2025-07-11 | End: 2025-07-11 | Stop reason: HOSPADM

## 2025-07-11 RX ORDER — ACETAMINOPHEN 500 MG
1000 TABLET ORAL ONCE
Status: DISCONTINUED | OUTPATIENT
Start: 2025-07-11 | End: 2025-07-11 | Stop reason: HOSPADM

## 2025-07-11 RX ORDER — ONDANSETRON 4 MG/1
4 TABLET, FILM COATED ORAL EVERY 8 HOURS PRN
Qty: 12 TABLET | Refills: 0 | Status: SHIPPED | OUTPATIENT
Start: 2025-07-11

## 2025-07-11 RX ORDER — DEXAMETHASONE SODIUM PHOSPHATE 4 MG/ML
VIAL (ML) INJECTION AS NEEDED
Status: DISCONTINUED | OUTPATIENT
Start: 2025-07-11 | End: 2025-07-11 | Stop reason: SURG

## 2025-07-11 RX ORDER — ONDANSETRON 2 MG/ML
4 INJECTION INTRAMUSCULAR; INTRAVENOUS EVERY 6 HOURS PRN
Status: DISCONTINUED | OUTPATIENT
Start: 2025-07-11 | End: 2025-07-11

## 2025-07-11 RX ORDER — ROCURONIUM BROMIDE 10 MG/ML
INJECTION, SOLUTION INTRAVENOUS AS NEEDED
Status: DISCONTINUED | OUTPATIENT
Start: 2025-07-11 | End: 2025-07-11 | Stop reason: SURG

## 2025-07-11 RX ORDER — HYDROCODONE BITARTRATE AND ACETAMINOPHEN 5; 325 MG/1; MG/1
1-2 TABLET ORAL EVERY 4 HOURS PRN
Qty: 30 TABLET | Refills: 0 | Status: SHIPPED | OUTPATIENT
Start: 2025-07-11

## 2025-07-11 RX ORDER — HYDROCODONE BITARTRATE AND ACETAMINOPHEN 10; 325 MG/1; MG/1
1 TABLET ORAL ONCE AS NEEDED
Status: DISCONTINUED | OUTPATIENT
Start: 2025-07-11 | End: 2025-07-11

## 2025-07-11 RX ORDER — HYDROMORPHONE HYDROCHLORIDE 1 MG/ML
0.2 INJECTION, SOLUTION INTRAMUSCULAR; INTRAVENOUS; SUBCUTANEOUS EVERY 5 MIN PRN
Status: DISCONTINUED | OUTPATIENT
Start: 2025-07-11 | End: 2025-07-11

## 2025-07-11 RX ORDER — BUPIVACAINE HYDROCHLORIDE 5 MG/ML
INJECTION, SOLUTION EPIDURAL; INTRACAUDAL; PERINEURAL AS NEEDED
Status: DISCONTINUED | OUTPATIENT
Start: 2025-07-11 | End: 2025-07-11 | Stop reason: HOSPADM

## 2025-07-11 RX ORDER — ACETAMINOPHEN 500 MG
1000 TABLET ORAL ONCE AS NEEDED
Status: DISCONTINUED | OUTPATIENT
Start: 2025-07-11 | End: 2025-07-11

## 2025-07-11 RX ORDER — HYDROMORPHONE HYDROCHLORIDE 1 MG/ML
0.4 INJECTION, SOLUTION INTRAMUSCULAR; INTRAVENOUS; SUBCUTANEOUS EVERY 5 MIN PRN
Status: DISCONTINUED | OUTPATIENT
Start: 2025-07-11 | End: 2025-07-11

## 2025-07-11 RX ORDER — MIDAZOLAM HYDROCHLORIDE 1 MG/ML
INJECTION INTRAMUSCULAR; INTRAVENOUS AS NEEDED
Status: DISCONTINUED | OUTPATIENT
Start: 2025-07-11 | End: 2025-07-11 | Stop reason: SURG

## 2025-07-11 RX ORDER — SODIUM CHLORIDE, SODIUM LACTATE, POTASSIUM CHLORIDE, CALCIUM CHLORIDE 600; 310; 30; 20 MG/100ML; MG/100ML; MG/100ML; MG/100ML
INJECTION, SOLUTION INTRAVENOUS CONTINUOUS
Status: DISCONTINUED | OUTPATIENT
Start: 2025-07-11 | End: 2025-07-11

## 2025-07-11 RX ORDER — HYDROCODONE BITARTRATE AND ACETAMINOPHEN 10; 325 MG/1; MG/1
2 TABLET ORAL ONCE AS NEEDED
Status: DISCONTINUED | OUTPATIENT
Start: 2025-07-11 | End: 2025-07-11

## 2025-07-11 RX ORDER — LABETALOL HYDROCHLORIDE 5 MG/ML
5 INJECTION, SOLUTION INTRAVENOUS EVERY 5 MIN PRN
Status: DISCONTINUED | OUTPATIENT
Start: 2025-07-11 | End: 2025-07-11

## 2025-07-11 RX ORDER — MEPERIDINE HYDROCHLORIDE 25 MG/ML
INJECTION INTRAMUSCULAR; INTRAVENOUS; SUBCUTANEOUS
Status: COMPLETED
Start: 2025-07-11 | End: 2025-07-11

## 2025-07-11 RX ORDER — ONDANSETRON 2 MG/ML
INJECTION INTRAMUSCULAR; INTRAVENOUS AS NEEDED
Status: DISCONTINUED | OUTPATIENT
Start: 2025-07-11 | End: 2025-07-11 | Stop reason: SURG

## 2025-07-11 RX ORDER — MEPERIDINE HYDROCHLORIDE 25 MG/ML
12.5 INJECTION INTRAMUSCULAR; INTRAVENOUS; SUBCUTANEOUS AS NEEDED
Status: DISCONTINUED | OUTPATIENT
Start: 2025-07-11 | End: 2025-07-11

## 2025-07-11 RX ORDER — ONDANSETRON 2 MG/ML
INJECTION INTRAMUSCULAR; INTRAVENOUS
Status: COMPLETED
Start: 2025-07-11 | End: 2025-07-11

## 2025-07-11 RX ORDER — DOCUSATE SODIUM 100 MG/1
100 CAPSULE, LIQUID FILLED ORAL 2 TIMES DAILY
Qty: 30 CAPSULE | Refills: 1 | Status: SHIPPED | OUTPATIENT
Start: 2025-07-11

## 2025-07-11 RX ADMIN — ROCURONIUM BROMIDE 20 MG: 10 INJECTION, SOLUTION INTRAVENOUS at 15:51:00

## 2025-07-11 RX ADMIN — ROCURONIUM BROMIDE 10 MG: 10 INJECTION, SOLUTION INTRAVENOUS at 16:30:00

## 2025-07-11 RX ADMIN — SODIUM CHLORIDE, SODIUM LACTATE, POTASSIUM CHLORIDE, CALCIUM CHLORIDE: 600; 310; 30; 20 INJECTION, SOLUTION INTRAVENOUS at 17:09:00

## 2025-07-11 RX ADMIN — ROCURONIUM BROMIDE 20 MG: 10 INJECTION, SOLUTION INTRAVENOUS at 15:12:00

## 2025-07-11 RX ADMIN — DEXAMETHASONE SODIUM PHOSPHATE 4 MG: 4 MG/ML VIAL (ML) INJECTION at 13:23:00

## 2025-07-11 RX ADMIN — ONDANSETRON 4 MG: 2 INJECTION INTRAMUSCULAR; INTRAVENOUS at 13:23:00

## 2025-07-11 RX ADMIN — ROCURONIUM BROMIDE 20 MG: 10 INJECTION, SOLUTION INTRAVENOUS at 14:41:00

## 2025-07-11 RX ADMIN — ROCURONIUM BROMIDE 50 MG: 10 INJECTION, SOLUTION INTRAVENOUS at 13:23:00

## 2025-07-11 RX ADMIN — MIDAZOLAM HYDROCHLORIDE 2 MG: 1 INJECTION INTRAMUSCULAR; INTRAVENOUS at 13:21:00

## 2025-07-11 NOTE — DISCHARGE INSTRUCTIONS
Plastic Surgery Home Care Instructions      We hope you were pleased with your care at [facility].  We wish you the best outcome and overall experience with your operation.  These instructions will help to minimize pain, optimize healing, and improve the likelihood of a successful result.    What To Expect  There will be some spotting of the incision lines for the next few days.  Your breasts will be swollen and might feel congested for the next 1-2 weeks  Temporary areas of numbness are typical in the early weeks following a breast procedure.  Normalization of sensation can typically take up to several months following the operation.  Please DO NOT apply heat or ice to the affected area    Bandages (Dressing)  Keep dressings clean and dry, leave topifoam until Friday f/u  Do not remove your Ace wrap unless for hygiene purposes - compression is key - especially at night.  You are to wear your Ace wrap 24/7 for 6 weeks post-operatively.   Reinforce the dressing with insertion of additional padding as needed - this is not required - for your comfort only  Leave white steri-strips in place over incisions.    Drain Care  Proper drain care is an important part of your home care and will help to prevent fluid collection, minimize the risk for infection, and increase the success of your surgery.  Empty your drains at 8 am and 8 pm each day  Record each drain separately and use the drain sheet given to you to record the drainage. Follow the instructions on the drain sheet.  Wash your hands before and after emptying your drains  Strip drain tubing before emptying  If your drain dressing gets wet or you notice moisture on the drain dressing, remove dressing, clean with alcohol and apply a new biopatch and tegederm.  Bring drain sheet with you to your first office visit      Bathing/Showers  You can resume showering tomorrow from the waist down until Friday f/u. When able to resume normal showers, let the soap and water  run over incisions, do not scrub.   No baths, swimming, or hot tubs until you receive medical permission    Pain Medication: Norco  Take one or two tablets every six hours as needed for pain.  Do not take narcotics, if you do not have pain. You can take Tylenol if you are not taking Norco. DO NOT take both Tylenol and Norco together as there is already Tylenol in the Norco.  Keep stools soft.  Take a stool softener (Metamucil, Milk of Magnesia, Colace).  Eating fruit will also help to prevent constipation    Over-The-Counter Medication  Non-prescription anti-inflammatory medications can also help to ease the pain.  You can take Aleve or ibuprofen starting 48 hours after surgery  Take as directed on the bottle  Drink a full glass of water with the medication    Home Medication  Resume your home medications as instructed  Do not resume herbal medications for two weeks    Diet  Resume your normal diet    Activity  No strenuous activity or heavy lifting (no lifting over 10 pounds)  No activities that involve your pectoralis muscles (no planks, push-ups, etc)  You can go up and down the stairs as tolerated.   You cannot return to work, if your work requires strenuous activity.  You cannot return to physical exercise, sports, or gym workouts until you are allowed to participate in strenuous activity.    Driving  Do not drive, if you are taking pain medication.    Return to Work or School  You can return to work when you are not taking pain medication, if your work does not involve strenuous activity.  Contact the office, if you need a medical note.     Follow-up Appointment   Our office will call you to set up a time    Questions or Concerns  Call Dr. Fontenot's office if you experience severe pain not controlled by pain medication, swelling, numbness, tingling, bleeding, fever, or other concerns.   If he is not available, another physician will be able to address your concerns.    Freddie     Thank you for coming to [facility] for your operation.  The nurses and the anesthesiologist try very hard to make sure you receive the best care possible.  Your trust in them is greatly appreciated.      Thanks so much,  Dr. Po Kelly, FNP-C  Gita Rick FNP-C

## 2025-07-11 NOTE — ANESTHESIA POSTPROCEDURE EVALUATION
[facility]    Freddie Bauer Patient Status:  Hospital Outpatient Surgery   Age/Gender 25 year old male MRN [id number]   Location [facility] SURGERY Attending Sherman Fontenot MD   Hosp Day # 0 JAYME Rdz MD       Anesthesia Post-op Note    excision and liposuction of bilateral gynecomastia    Procedure Summary       Date: 07/11/25 Room / Location:  MAIN OR 14 / [facility] MAIN OR    Anesthesia Start: 1319 Anesthesia Stop: 1709    Procedure: excision and liposuction of bilateral gynecomastia (Bilateral: Breast) Diagnosis:       Gynecomastia      (Gynecomastia [N62])    Surgeons: Sherman Fontenot MD Anesthesiologist: Carlito Montiel MD    Anesthesia Type: general ASA Status: 2            Anesthesia Type: general    Vitals Value Taken Time   /64 07/11/25 17:09   Temp 97.6 °F (36.4 °C) 07/11/25 17:09   Pulse 98 07/11/25 17:10   Resp 12 07/11/25 17:10   SpO2 100 % 07/11/25 17:10   Vitals shown include unfiled device data.        Patient Location: PACU    Anesthesia Type: general    Airway Patency: patent and extubated    Postop Pain Control: inadequate, being treated    Mental Status: mildly sedated but able to meaningfully participate in the post-anesthesia evaluation    Nausea/Vomiting: none    Cardiopulmonary/Hydration status: stable euvolemic    Complications: no apparent anesthesia related complications    Postop vital signs: stable    Dental Exam: Unchanged from Preop    Patient to be discharged from PACU when criteria met.
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
## 2025-07-11 NOTE — BRIEF OP NOTE
Discharge Summary/Instructions after an Endoscopic Procedure  Patient Name: Mauro Moreira  Patient MRN: [id number]  Patient YOB: 1961  Wednesday, August 29, 2018  Jefry Fisher MD  RESTRICTIONS:  During your procedure today, you received medications for sedation.  These   medications may affect your judgment, balance and coordination.  Therefore,   for 24 hours, you have the following restrictions:   - DO NOT drive a car, operate machinery, make legal/financial decisions,   sign important papers or drink alcohol.    ACTIVITY:  Today: no heavy lifting, straining or running due to procedural   sedation/anesthesia.  The following day: return to full activity including work.  DIET:  Eat and drink normally unless instructed otherwise.     TREATMENT FOR COMMON SIDE EFFECTS:  - Mild abdominal pain, nausea, belching, bloating or excessive gas:  rest,   eat lightly and use a heating pad.  - Sore Throat: treat with throat lozenges and/or gargle with warm salt   water.  - Because air was used during the procedure, expelling large amounts of air   from your rectum or belching is normal.  - If a bowel prep was taken, you may not have a bowel movement for 1-3 days.    This is normal.  SYMPTOMS TO WATCH FOR AND REPORT TO YOUR PHYSICIAN:  1. Abdominal pain or bloating, other than gas cramps.  2. Chest pain.  3. Back pain.  4. Signs of infection such as: chills or fever occurring within 24 hours   after the procedure.  5. Rectal bleeding, which would show as bright red, maroon, or black stools.   (A tablespoon of blood from the rectum is not serious, especially if   hemorrhoids are present.)  6. Vomiting.  7. Weakness or dizziness.  GO DIRECTLY TO THE NEAREST EMERGENCY ROOM IF YOU HAVE ANY OF THE FOLLOWING:      Difficulty breathing              Chills and/or fever over 101 F   Persistent vomiting and/or vomiting blood   Severe abdominal pain   Severe chest pain   Black, tarry stools   Bleeding- more than one  Pre-Operative Diagnosis: Gynecomastia [N62]     Post-Operative Diagnosis: Gynecomastia [N62]      Procedure Performed:   excision and liposuction of bilateral gynecomastia    Surgeons and Role:     * Sherman Fontenot MD - Primary    Assistant(s):  APN: Lynn Kelly APRN     Surgical Findings: Nl     Specimen: Bilateral breast tissue     Estimated Blood Loss: 10ml    Sherman Fontenot MD  7/11/2025  4:55 PM           tablespoon   Any other symptom or condition that you feel may need urgent attention  Your doctor recommends these additional instructions:  If any biopsies were taken, your doctors clinic will contact you in 1 to 2   weeks with any results.  - Repeat colonoscopy in 5 years for surveillance.   - Return to referring physician as previously scheduled.   - Discharge patient to home (via wheelchair).  For questions, problems or results please call your physician - Jefry Fisher MD at Work:  [phone].  [facility] can be reached at [phone]     IF A COMPLICATION OR EMERGENCY SITUATION ARISES AND YOU ARE UNABLE TO REACH   YOUR PHYSICIAN - GO DIRECTLY TO THE EMERGENCY ROOM.  Jefry Fisher MD  8/29/2018 10:52:36 AM  This report has been verified and signed electronically.  [employer]

## 2025-07-11 NOTE — H&P
No change in Dr. Rdz's H&P from 6/25.  We reviewed the plan for discussed bilateral breast liposuction and direct gland excision via an infra areolar approach. The nature of the procedure was reviewed with the patient. We discussed the risks of surgery including but not limited to bleeding, infection, scarring, delayed wound healing, loss of nipple sensation, nipple areolar necrosis, contour abnormalities, asymmetry, injury to adjacent structures, seroma, and need for further surgery. We discussed expected postoperative course including need for drains, activity limitation, and compression. Multiple questions were answered to the patient satisfaction. No guarantees as to outcome were offered. The patient expresses understanding and wishes to proceed.

## 2025-07-11 NOTE — ANESTHESIA PROCEDURE NOTES
Airway  Date/Time: 7/11/2025 1:26 PM  Reason: elective      General Information and Staff   Patient location during procedure: OR  Anesthesiologist: Carlito Montiel MD  Performed: anesthesiologist   Performed by: Carlito Montiel MD  Authorized by: Carlito Montiel MD        Indications and Patient Condition  Indications for airway management: anesthesia  Sedation level: deep      Preoxygenated: yesPatient position: sniffing    Mask difficulty assessment: 1 - vent by mask    Final Airway Details    Final airway type: endotracheal airway    Successful airway: ETT  Cuffed: yes   Successful intubation technique: direct laryngoscopy  Endotracheal tube insertion site: oral  Blade: Reddy  Blade size: #4  ETT size (mm): 7.0    Cormack-Lehane Classification: grade I - full view of glottis  Placement verified by: capnometry   Measured from: lips  ETT to lips (cm): 22  Number of attempts at approach: 1

## 2025-07-12 NOTE — OPERATIVE REPORT
[facility]    PATIENT'S NAME: PHUONG MILES   ATTENDING PHYSICIAN: Sherman Fontenot M.D.   OPERATING PHYSICIAN: Sherman Fontenot M.D.   PATIENT ACCOUNT#:   [id number]    LOCATION:  PACU  PACU 1 [facility]  MEDICAL RECORD #:   [id number]       YOB: 2000  ADMISSION DATE:       07/11/2025      OPERATION DATE:  07/11/2025    OPERATIVE REPORT    PREOPERATIVE DIAGNOSIS:  Gynecomastia.  POSTOPERATIVE DIAGNOSIS:  Gynecomastia.  PROCEDURE:  Excision of bilateral breast gynecomastia and bilateral breast liposuction.    ASSISTANT:  KHANH Ng    ANESTHESIA:  General.    ESTIMATED BLOOD LOSS:  25 mL.    COMPLICATIONS:  None.    INDICATIONS:  The patient is a 25-year-old male with bilateral gynecomastia.  The patient was referred, and treatment options were discussed.  He wished to proceed with liposuction and direct excision.    OPERATIVE TECHNIQUE:  Informed consent was obtained from the patient.  The risks, benefits, and alternatives were reviewed with the patient preoperatively.  He expressed understanding and wished to proceed.  The patient was marked in the preoperative holding area.  The areas of glandular hypertrophy were marked.  Areas of lipodystrophy of the peripheral areas of the breast were marked.  The patient was then taken to the operating room, properly identified, placed in a supine position.  Sequential compression devices were placed on bilateral lower extremities.  Intravenous antibiotic prophylaxis was administered.  The patient then underwent successful induction of general anesthesia and endotracheal intubation.  The arms were placed abducted on foam-padded cradles and loosely secured with Kerlix.  The chest was prepped and draped sterilely.  The liposuction port sites at the lateral border of the breast were infiltrated with 1% lidocaine with epinephrine.  Stab incisions were then created and 200 mL of tumescent solution was infiltrated into each breast.  After allowing for the  tumescent to take effect, liposuction of both breasts was performed using a 4 and 5 mm Mercedes tip cannula after passage of a basket cannula off suction.  Then, 80 mL of lipoaspirate were collected from the right breast and 120 mL of lipoaspirate collected from the left breast.  Following liposuction, persistent visibility of the underlying gland was noted.  Hence, an infra-areolar incision was planned bilaterally.  The procedure began on the right side.  The incision was created with a scalpel and deepened to the underlying breast parenchyma with electrocautery.  Using electrocautery, direct excision of the gland was performed.  All of the excised tissue was sent for permanent pathologic analysis.  Piecemeal excision was performed until appropriate contour was noted.  The wound was then irrigated with saline irrigation.  Hemostasis was secured with electrocautery.  Attention was turned to the left breast.  In a similar fashion, the infra-areolar incision was created with a scalpel.  The subcutaneous tissue was divided with electrocautery.  Direct excision of the gland was then performed using electrocautery.  Additional piecemeal excision was performed until appropriate contour was achieved.  The patient was then placed in the upright position.  Good shape and contour were noted.  Both pockets were rinsed with saline and then antibiotic irrigation.  Hemostasis was secured with electrocautery.  A 15-Pakistani Js drain on each side was exited through a lateral stab incision and sutured to the skin with 3-0 nylon suture.  The wound was then repaired with 3-0 Vicryl deep sutures, 3-0 Vicryl deep dermal sutures, and 4-0 Monocryl subcuticular suture.  Biopatch and Tegaderm were placed on the drain sites.  Exofin and Steri-Strips were placed on the incisions.  TopiFoam and Ace wrap were applied.  The patient was awakened, extubated, and taken to the recovery area in stable condition.  There were no operative  complications.  All needle, sponge, and instrument counts were correct at the end of the procedure.    Dictated By Sherman Fontenot M.D.  d: 07/11/2025 17:51:15  t: 07/11/2025 18:18:30  [facility] [id number]  [facility]/

## 2025-07-14 ENCOUNTER — TELEPHONE (OUTPATIENT)
Facility: CLINIC | Age: 25
End: 2025-07-14

## 2025-07-14 NOTE — TELEPHONE ENCOUNTER
Patient's mother contacted the answering service this morning reporting \"odd sensations.\"  Attempted to call patient x2 and left voicemails.  Called patient's mother to discuss. The patient has \"unusual pain\" to his right breast that has been occurring since surgery. Inquired further about the nature of the pain and she could not elaborate further. The patient is asleep. Inquired about the appearance of the surgical sites, they have not removed the ACE wrap yet. Asked that they remove the wrap, take photos, and send photos for our review. Patient is to reapply compression wrap after photos are taken.

## 2025-07-15 ENCOUNTER — OFFICE VISIT (OUTPATIENT)
Dept: SURGERY | Facility: CLINIC | Age: 25
End: 2025-07-15
Payer: COMMERCIAL

## 2025-07-15 DIAGNOSIS — N62 GYNECOMASTIA: Primary | ICD-10-CM

## 2025-07-15 PROCEDURE — 99024 POSTOP FOLLOW-UP VISIT: CPT

## 2025-07-15 NOTE — PROGRESS NOTES
Freddie Bauer is a 25 year old male who presents today for a follow-up after excision of bilateral breast gynecomastia and bilateral breast liposuction with Dr. Fontenot on 7/11/2025.    He denies fever and chills. He denies nausea, vomiting, diarrhea or constipation.   His pain is controlled.  Patient does report mild discomfort when stripping drain tubing on his right breast.    Physical Exam     Breasts: Bilateral breast incisions are clean, dry, intact.  There is no evidence of hematoma or seroma bilaterally.  There is no erythema bilaterally.  Bilateral breast drain sites are clean, dry, intact with serous fluid present.    There were no vitals filed for this visit.      Assessment and Plan     Freddie Bauer is doing well s/p excision of bilateral breast gynecomastia and bilateral breast liposuction with Dr. Fontenot on 7/11/2025.    Patient is here today for evaluation and drain removal.  The bilateral breast drains were removed today due to low volume output.  The patient tolerated this well.  A dressing of bacitracin, 2 x 2, Tegaderm was placed.  TopiFoam and Ace wrap were reapplied.    Compression guidelines reviewed with the patient.  A compression garment was recommended to the patient and his mother.  The patient has a postoperative appointment on 7-18.  He also has a postoperative appointment made with Dr. Fontenot on 8-29.  He was encouraged to contact the office with any additional questions or concerns.    Questions were answered. Patient understands.     The 21st Century Cures Act makes medical notes like these available to patients in the interest of transparency. Please be advised this is a medical document. Medical documents are intended to carry relevant information, facts as evident, and the clinical opinion of the practitioner. The medical note is intended as peer to peer communication and may appear blunt or direct. It is written in medical language and may contain abbreviations or verbiage that  are unfamiliar.     Lynn Kelly, APRN  7/15/2025  11:36 AM

## 2025-07-18 ENCOUNTER — OFFICE VISIT (OUTPATIENT)
Dept: SURGERY | Facility: CLINIC | Age: 25
End: 2025-07-18
Payer: COMMERCIAL

## 2025-07-18 DIAGNOSIS — N62 GYNECOMASTIA: Primary | ICD-10-CM

## 2025-07-18 PROCEDURE — 99024 POSTOP FOLLOW-UP VISIT: CPT

## 2025-07-18 NOTE — PROGRESS NOTES
Freddie Bauer is a 25 year old male who presents today for a follow-up after excision of bilateral breast gynecomastia and bilateral breast liposuction with Dr. Fontenot on 7/11/2025.     He denies fever and chills. He denies nausea, vomiting, diarrhea or constipation.   His pain is controlled.  He presents today for wound check. He is accompanied by his mother.    Physical Exam     Breasts: Bilateral breast incisions are clean, dry, intact.  No tense of hematoma or seroma.  No evidence of wound drainage or dehiscence.  No erythema or ecchymosis bilaterally.    There were no vitals filed for this visit.      Assessment and Plan     Freddie Bauer is doing well s/p excision of bilateral breast gynecomastia and bilateral breast liposuction with Dr. Fontenot on 7/11/2025.    His bilateral breast incisions remain clean, dry, and intact.  Steri-Strips were redressed today.  The Monocryl suture was trimmed today on the lateral aspect of his incision.  He tolerated this well.  He may leave the Steri-Strips in place for 7 to 10 days, then he may gently remove them and leave incisions open to air.  We reviewed continued compressions and continued activity restrictions 6 weeks postop.  We reviewed reasons to contact our office such as fevers, chills, severe pain, wound drainage, or wound dehiscence.    He has a follow-up with Dr. Fontenot for a postop visit on August 29, 2025.  He was encouraged to contact our office for questions or concerns in the meantime.  All his questions and concerns were addressed today.    The 21st Century Cures Act makes medical notes like these available to patients in the interest of transparency. Please be advised this is a medical document. Medical documents are intended to carry relevant information, facts as evident, and the clinical opinion of the practitioner. The medical note is intended as peer to peer communication and may appear blunt or direct. It is written in medical language and may  contain abbreviations or verbiage that are unfamiliar.     Gita PHELPS, APRN  7/18/2025  11:55 AM

## 2025-07-31 ENCOUNTER — TELEPHONE (OUTPATIENT)
Dept: PHYSICAL THERAPY | Facility: HOSPITAL | Age: 25
End: 2025-07-31

## 2025-08-04 ENCOUNTER — OFFICE VISIT (OUTPATIENT)
Dept: PHYSICAL THERAPY | Age: 25
End: 2025-08-04
Attending: PEDIATRICS

## 2025-08-04 DIAGNOSIS — M23.671: Primary | ICD-10-CM

## 2025-08-04 PROCEDURE — 97110 THERAPEUTIC EXERCISES: CPT

## 2025-08-04 PROCEDURE — 97140 MANUAL THERAPY 1/> REGIONS: CPT

## 2025-08-04 PROCEDURE — 97161 PT EVAL LOW COMPLEX 20 MIN: CPT

## 2025-08-07 ENCOUNTER — OFFICE VISIT (OUTPATIENT)
Dept: PHYSICAL THERAPY | Age: 25
End: 2025-08-07
Attending: PEDIATRICS

## 2025-08-07 PROCEDURE — 97140 MANUAL THERAPY 1/> REGIONS: CPT

## 2025-08-07 PROCEDURE — 97110 THERAPEUTIC EXERCISES: CPT

## 2025-08-07 PROCEDURE — 97112 NEUROMUSCULAR REEDUCATION: CPT

## 2025-08-11 ENCOUNTER — OFFICE VISIT (OUTPATIENT)
Dept: PHYSICAL THERAPY | Age: 25
End: 2025-08-11
Attending: PEDIATRICS

## 2025-08-11 PROCEDURE — 97140 MANUAL THERAPY 1/> REGIONS: CPT

## 2025-08-11 PROCEDURE — 97110 THERAPEUTIC EXERCISES: CPT

## 2025-08-14 ENCOUNTER — OFFICE VISIT (OUTPATIENT)
Dept: PHYSICAL THERAPY | Age: 25
End: 2025-08-14
Attending: PEDIATRICS

## 2025-08-14 PROCEDURE — 97110 THERAPEUTIC EXERCISES: CPT

## 2025-08-14 PROCEDURE — 97140 MANUAL THERAPY 1/> REGIONS: CPT

## 2025-08-18 ENCOUNTER — OFFICE VISIT (OUTPATIENT)
Dept: PHYSICAL THERAPY | Age: 25
End: 2025-08-18
Attending: PEDIATRICS

## 2025-08-18 PROCEDURE — 97530 THERAPEUTIC ACTIVITIES: CPT

## 2025-08-18 PROCEDURE — 97110 THERAPEUTIC EXERCISES: CPT

## 2025-08-18 PROCEDURE — 97140 MANUAL THERAPY 1/> REGIONS: CPT

## 2025-08-21 ENCOUNTER — OFFICE VISIT (OUTPATIENT)
Dept: PHYSICAL THERAPY | Age: 25
End: 2025-08-21
Attending: PEDIATRICS

## 2025-08-21 PROCEDURE — 97140 MANUAL THERAPY 1/> REGIONS: CPT

## 2025-08-21 PROCEDURE — 97110 THERAPEUTIC EXERCISES: CPT

## 2025-08-25 ENCOUNTER — OFFICE VISIT (OUTPATIENT)
Dept: PHYSICAL THERAPY | Age: 25
End: 2025-08-25
Attending: PEDIATRICS

## 2025-08-25 PROCEDURE — 97112 NEUROMUSCULAR REEDUCATION: CPT

## 2025-08-25 PROCEDURE — 97140 MANUAL THERAPY 1/> REGIONS: CPT

## 2025-08-25 PROCEDURE — 97530 THERAPEUTIC ACTIVITIES: CPT

## 2025-08-28 ENCOUNTER — OFFICE VISIT (OUTPATIENT)
Dept: PHYSICAL THERAPY | Age: 25
End: 2025-08-28
Attending: PEDIATRICS

## 2025-08-28 PROCEDURE — 97110 THERAPEUTIC EXERCISES: CPT

## 2025-08-28 PROCEDURE — 97140 MANUAL THERAPY 1/> REGIONS: CPT

## 2025-08-29 ENCOUNTER — OFFICE VISIT (OUTPATIENT)
Dept: SURGERY | Facility: CLINIC | Age: 25
End: 2025-08-29

## 2025-08-29 DIAGNOSIS — N62 GYNECOMASTIA: Primary | ICD-10-CM

## 2025-08-29 PROCEDURE — 99024 POSTOP FOLLOW-UP VISIT: CPT | Performed by: SURGERY

## (undated) DEVICE — GLOVE SUR 8 SENSICARE PI PIP CRM PWD F

## (undated) DEVICE — SLIM BODY SKIN STAPLER: Brand: APPOSE ULC

## (undated) DEVICE — PACK CDS PLASTIC BREAST

## (undated) DEVICE — 3M™ IOBAN™ 2 ANTIMICROBIAL INCISE DRAPE 6648EZ: Brand: IOBAN™ 2

## (undated) DEVICE — ASPIRATION TUBING SET, DISPOSABLE: Brand: MICROAIRE®

## (undated) DEVICE — GLOVE SUR 6.5 SENSICARE PI PIP CRM PWD F

## (undated) DEVICE — #10 STERILE BLADE: Brand: POLYMER COATED BLADES

## (undated) DEVICE — 3M™ STERI-STRIP™ REINFORCED ADHESIVE SKIN CLOSURES, R1547, 1/2 IN X 4 IN (12 MM X 100 MM), 6 STRIPS/ENVELOPE: Brand: 3M™ STERI-STRIP™

## (undated) DEVICE — CG INFILTRATION TUBING: Brand: CG INFILTRATION TUBING

## (undated) DEVICE — SUT MCRYL 4-0 18IN PS-2 ABSRB UD 19MM 3/8 CIR

## (undated) DEVICE — EXOFIN PRECISION PEN HIGH VISCOSITY TOPICAL SKIN ADHESIVE: Brand: EXOFIN PRECISION PEN, 1G

## (undated) DEVICE — 3M™ STERI-STRIP™ REINFORCED ADHESIVE SKIN CLOSURES, R1548, 1 IN X 5 IN (25 MM X 125 MM), 4 STRIPS/ENVELOPE: Brand: 3M™ STERI-STRIP™

## (undated) DEVICE — MARKER SKIN PREP-RESISTANT ST: Brand: MEDLINE INDUSTRIES, INC.

## (undated) DEVICE — INSULATED BLADE ELECTRODE: Brand: EDGE

## (undated) DEVICE — GLOVE SUR 7.5 SENSICARE PI PIP CRM PWD F

## (undated) DEVICE — SOLUTION IRRIG 1000ML 0.9% NACL USP BTL

## (undated) DEVICE — E-Z CLEAN, NON-STICK, PTFE COATED, ELECTROSURGICAL NEEDLE ELECTRODE, MODIFIED EXTENDED INSULATION, 2.75 INCH (7 CM): Brand: MEGADYNE

## (undated) DEVICE — DRAPE,TOWEL,LARGE,INVISISHIELD: Brand: MEDLINE

## (undated) DEVICE — SUT ETHLN 3-0 18IN PS-2 NABSRB BLK 19MM 3/8 C

## (undated) DEVICE — EVACUATOR SUR 100CC SIL BLB WND

## (undated) DEVICE — SLEEVE COMPR MD KNEE LEN SGL USE KENDALL SCD

## (undated) DEVICE — ANTIBACTERIAL UNDYED BRAIDED (POLYGLACTIN 910), SYNTHETIC ABSORBABLE SUTURE: Brand: COATED VICRYL

## (undated) DEVICE — DRAIN SUR 15FR L3/16IN DIA4.7MM SIL RND

## (undated) NOTE — ED AVS SNAPSHOT
Jose Juan Morelandon   MRN: [id number]    Department:  [facility] Emergency Department   Date of Visit:  3/4/2020           Disclosure     Insurance plans vary and the physician(s) referred by the ER may not be covered by your plan.  Please contact your tell this physician (or your personal doctor if your instructions are to return to your personal doctor) about any new or lasting problems. The primary care or specialist physician will see patients referred from the [facility] Emergency Department.  Agustina Hitchcock